# Patient Record
(demographics unavailable — no encounter records)

---

## 2024-11-04 NOTE — HISTORY OF PRESENT ILLNESS
[FreeTextEntry1] : Pt is a 49 y/o male with DM2, HLD, HTN, CAD.  DM diagnosis: 20 years, DM2 Last A1c: 7.0%-->7.8% Home DM meds: Xigduo 5-1000mg BID, ozempic 2mg weekly, T-slim insulin pump. Doesn't have credentials today to log into pump. 14 day delivery summary: basal 21 units, 19.1%; food bolus 79.5 units, 71.5%; correction bolus 10.27u, 9.2%; override 4.93, 51.8%; control iq 4.57, 48.1% Manual settings:  Basal - 12a 1.5 units TDBD 24 units ICR - 1:6 ISF 1:25  IOB 5 hours Patient not sleeping enough. Working a lot. Works for [employer]. Sometimes doesn't wear insulin pump at all. Happens for a few hours on some days. SMBG: Cannot access CGM report now. Fasting low 100s-150s. No lows. After carbs goes up to 200s sometimes. Symptoms: No N/V/D. Diet at home: Gets hyperglycemia even with minimal carbs. Lost 11 pounds. Due to stress he thinks. Is stress eating fruit at night though. Has been physically active. Microvascular complications: Has stable moderately increased albuminuria on farxiga, no CKD, UTD with ophtho, no retinopathy, gets some neuropathy Macrovascular complications: CAD on ASA and rosuvastatin 40mg daily. Following with cardio. ACEi/ARB: telmisartan Statin: crestor 40 and asa 81mg PMHx: As above. No hx of HF, pancreatitis, UTIs/yeast infections. No amputations or wounds. FHx: Mother with DM and maybe father. Father with sudden fatal MI age 39 or 40. No thyroid cancer. SHx: No tobacco. Occasional etoh use, once a month.

## 2024-11-04 NOTE — ASSESSMENT
[FreeTextEntry1] : Pt is a 49 y/o male with DM2, HLD, HTN, CAD.  Well-controlled T2DM complicated by neuropathy Last A1c: 7.0%-->7.8% Previous endocrinologist: Woo Walsh Home DM meds: Xigduo 5-1000mg BID, ozempic 2mg weekly, T-slim insulin pump. Doesn't have credentials today to log into pump. 14 day delivery summary: basal 21 units, 19.1%; food bolus 79.5 units, 71.5%; correction bolus 10.27u, 9.2%; override 4.93, 51.8%; control iq 4.57, 48.1% Manual settings:  Basal - 12a 1.5 units TDBD 24 units ICR - 1:6 ISF 1:25  IOB 5 hours -Continue xigduo 5-1000mg BID -Switch ozempic to mounjaro 5mg weekly. Can go up to 7.5mg weekly after 1 month if needed. -Patient left insulin pump at home today, encouraged adherence, will consider further adjustments next time. Will also try to get credentials so we can login to profile. -Patient advised to check FSBG twice daily in staggered manner and bring logs to next visit -Obtain urine albumin-to-creatinine ratio annually to screen for moderately increased albuminuria -Screen for Vitamin B12 deficiency annually as on Metformin -UTD with ophthalmologist for dilated eye exam -UTD with podiatrist -Referral to nutritionist  HTN -Goal BP <130/80. On telmisartan.  HLD CAD -Continue rosuvastatin 40mg daily and ASA 81mg daily. Has significant fhx of heart disease and CAD personally. Discussed need for tight control of BG and lipids.  Thyroid nodules Spongiform, subcm, no need to f/u with imaging  Patient's condition is high risk with use of medication that requires close monitoring.  Tanner France DO.

## 2025-03-13 NOTE — PHYSICAL EXAM
[2+] : left foot dorsalis pedis 2+ [Varicose Veins Of Lower Extremities] : not present [FreeTextEntry3] : Temperature gradient cooler distally. There is some discoloration in the distal aspects of both feet, unsure if he has Raynaud's syndrome. Patient denies any pain at this time. Slight delay in cap. fill time 5 seconds x10.  [de-identified] : Muscle strength is 5/5. There is negative joint effusion or pain on pedal ROM. Negative pain on palpation of the lesser MPJ's. Negative pain on palpation along the metatarsal heads or shafts bilaterally. There is negative pain on medial and lateral compression of the foot. [FreeTextEntry4] :   decreased vibratory at the 1st MPJ and minimally decreased at the medial malleoli  [FreeTextEntry8] :   decreased vibratory at the 1st MPJ and minimally decreased at the medial malleoli  [FreeTextEntry1] : Light touch is intact bilateral.

## 2025-03-13 NOTE — HISTORY OF PRESENT ILLNESS
[FreeTextEntry1] : Patient presents today with complaint of pain in the plantar aspect of his right foot. He states it has been present for approximately several days. He thought maybe he stepped on something. He does work from home. He is barefoot in the house. He denies recalling stepping on anything. He started feeling tenderness on the plantar aspect of that right foot. The patient stated he had a previous history in the past where he had gone to the hospital. They took an x-ray. They debrided. There was some pus there years ago and he was just concerned if there was a recurrence of the problem. They had not found a foreign body at that time. He denies any trauma to the foot. He denies changing any shoe gear. He presents today wearing Crocs. He denies any fever, chills or any other constitutional symptoms. The patient has seen vascular in the past due to changes in the appearance of the toes. He was worked up for acute ischemia. He states he had ANTHONY's and PVR's at the time and there was no significance of arterial insufficiency at that time in December 2023. No further work-up had been warranted. The patient's hemoglobin A1c was 7.8 in November.

## 2025-03-13 NOTE — PHYSICAL EXAM
[2+] : left foot dorsalis pedis 2+ [Varicose Veins Of Lower Extremities] : not present [FreeTextEntry3] : Temperature gradient cooler distally. There is some discoloration in the distal aspects of both feet, unsure if he has Raynaud's syndrome. Patient denies any pain at this time. Slight delay in cap. fill time 5 seconds x10.  [de-identified] : Muscle strength is 5/5. There is negative joint effusion or pain on pedal ROM. Negative pain on palpation of the lesser MPJ's. Negative pain on palpation along the metatarsal heads or shafts bilaterally. There is negative pain on medial and lateral compression of the foot. [FreeTextEntry4] :   decreased vibratory at the 1st MPJ and minimally decreased at the medial malleoli  [FreeTextEntry8] :   decreased vibratory at the 1st MPJ and minimally decreased at the medial malleoli  [FreeTextEntry1] : Light touch is intact bilateral.

## 2025-03-13 NOTE — PROCEDURE
[FreeTextEntry1] : X-ray Report: X-rays were taken, right foot DP, medial oblique and lateral to rule out foreign body. There were negative signs of acute fracture, dislocation or subluxation. There was negative foreign body or gas in tissue noted.

## 2025-03-13 NOTE — PHYSICAL EXAM
[2+] : left foot dorsalis pedis 2+ [Varicose Veins Of Lower Extremities] : not present [FreeTextEntry3] : Temperature gradient cooler distally. There is some discoloration in the distal aspects of both feet, unsure if he has Raynaud's syndrome. Patient denies any pain at this time. Slight delay in cap. fill time 5 seconds x10.  [de-identified] : Muscle strength is 5/5. There is negative joint effusion or pain on pedal ROM. Negative pain on palpation of the lesser MPJ's. Negative pain on palpation along the metatarsal heads or shafts bilaterally. There is negative pain on medial and lateral compression of the foot. [FreeTextEntry4] :   decreased vibratory at the 1st MPJ and minimally decreased at the medial malleoli  [FreeTextEntry8] :   decreased vibratory at the 1st MPJ and minimally decreased at the medial malleoli  [FreeTextEntry1] : Light touch is intact bilateral.

## 2025-03-13 NOTE — ASSESSMENT
[FreeTextEntry1] : Impression: Diabetic with neurological manifestations. IPK. Metatarsalgia.   Treatment: Discussed findings and conditions with the patient. The lesions on the plantar aspect of the right foot were prepped and trimmed without incidence. Negative signs of foreign body noted. Negative deeper portal of entry. Negative thrombosed vesicles or pinpoint bleeding upon treatment of the lesion. It was cleansed with normal saline and antibiotic ointment was applied to the area with a dispersion pad. He was supplied additional dispersion pads to off-load the area. He was encouraged to wear socks in the house along with slippers to help off-load the area especially since he does work from home. 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 Discussed risk of contact with the cold temperature of the floor that could also cause issues with vascular and risk of tissue thermal damage to the skin, along with the plantar aspect of the foot while walking barefoot or just with socks, he could step on a foreign body and be a portal of entry. At this time there is no foreign body noted. He is to continue with the padding at this time. Patient is to return in approximately 2 weeks for further evaluation. If there is increase in pain, redness, swelling, problems, or concern, he is to contact the office for an immediate appointment.

## 2025-05-20 NOTE — HISTORY OF PRESENT ILLNESS
[FreeTextEntry1] : Very pleasant 49-year-old gentleman who presents for evaluation of balanitis, phimosis.  He reports that he is able to retract his foreskin, however it is painful.  He reports foreskin fissures.  He reports balanitis.  He is interested in a circumcision.  He denies urinary complaints.  He reports diabetes is moderately controlled.  Most recent hemoglobin A1c from November 2024 was 7.8.

## 2025-05-20 NOTE — HEALTH RISK ASSESSMENT
[Good] : ~his/her~  mood as  good [Monthly or less (1 pt)] : Monthly or less (1 point) [1 or 2 (0 pts)] : 1 or 2 (0 points) [Never (0 pts)] : Never (0 points) [No] : In the past 12 months have you used drugs other than those required for medical reasons? No [No falls in past year] : Patient reported no falls in the past year [Little interest or pleasure doing things] : 1) Little interest or pleasure doing things [Feeling down, depressed, or hopeless] : 2) Feeling down, depressed, or hopeless [0] : 2) Feeling down, depressed, or hopeless: Not at all (0) [PHQ-2 Negative - No further assessment needed] : PHQ-2 Negative - No further assessment needed [Yes] : takes [Never] : Never [NO] : No [Patient reported colonoscopy was abnormal] : Patient reported colonoscopy was abnormal [HIV test declined] : HIV test declined [Hepatitis C test offered] : Hepatitis C test offered [None] : None [With Family] : lives with family [# of Members in Household ___] :  household currently consist of [unfilled] member(s) [Employed] : employed [College] : College [] :  [# Of Children ___] : has [unfilled] children [Feels Safe at Home] : Feels safe at home [Fully functional (bathing, dressing, toileting, transferring, walking, feeding)] : Fully functional (bathing, dressing, toileting, transferring, walking, feeding) [Fully functional (using the telephone, shopping, preparing meals, housekeeping, doing laundry, using] : Fully functional and needs no help or supervision to perform IADLs (using the telephone, shopping, preparing meals, housekeeping, doing laundry, using transportation, managing medications and managing finances) [Smoke Detector] : smoke detector [Carbon Monoxide Detector] : carbon monoxide detector [Safety elements used in home] : safety elements used in home [Seat Belt] :  uses seat belt [de-identified] : none [de-identified] : diabetic diet  [[id number]] : 0 [Change in mental status noted] : No change in mental status noted [Language] : denies difficulty with language [Behavior] : denies difficulty with behavior [Learning/Retaining New Information] : denies difficulty learning/retaining new information [Handling Complex Tasks] : denies difficulty handling complex tasks [Reasoning] : denies difficulty with reasoning [Spatial Ability and Orientation] : denies difficulty with spatial ability and orientation [Sexually Active] : not sexually active [Reports changes in hearing] : Reports no changes in hearing [Reports changes in vision] : Reports no changes in vision [Reports changes in dental health] : Reports no changes in dental health [Sunscreen] : does not use sunscreen [Travel to Developing Areas] : does not  travel to developing areas [TB Exposure] : is not being exposed to tuberculosis [Caregiver Concerns] : does not have caregiver concerns [ColonoscopyDate] : 12/10/21 [ColonoscopyComments] : hyperplastic polyps [FreeTextEntry2] :  [de-identified] : last eye exam 3months ago  [de-identified] : last dental 12/24 [AdvancecareDate] : 5/20/25

## 2025-05-20 NOTE — ASSESSMENT
[FreeTextEntry1] : Very pleasant 49-year-old gentleman who presents for evaluation of phimosis, balanitis - We had an extensive discussion regarding options for management of phimosis and balanitis.  We discussed the option to try a steroid-based and antifungal cream, however at this time he is interested in a circumcision as he is very uncomfortable and reports that foreskin fissures are painful -Check hemoglobin A1c.  We discussed the need to have this below 8 prior to doing circumcision -CMP -CBC -Urinalysis - I discussed the risks, benefits and alternatives to circumcision with the patient, including but not limited to bleeding, infection, pain, irritation of the glans, and penile injury.  I also discussed the need to refrain from sexual intercourse for 6 weeks, or until the circumcision incision heals.  The patient wishes to proceed and we will schedule the surgery for the near future.  Patient is being seen today for evaluation and management of a chronic and longitudinal ongoing condition and I am the primary treating physician

## 2025-05-20 NOTE — ASSESSMENT
[Vaccines Reviewed] : Immunizations reviewed today. Please see immunization details in the vaccine log within the immunization flowsheet.  [FreeTextEntry1] : health he needs  dental exam  psa  lab testing  and is up to date with  colon cancer screening   2 bmi 26   Weight loss, exercise, and diet control were discussed and are highly encouraged. Treatment options were given such as, aqua therapy, and contacting a nutritionist. Recommended to use the elliptical, stationary bike, less use of treadmill. Mindful eating was explained to the patient Obesity is associated with worsening asthma, shortness of breath, and potential for cardiac disease, diabetes, and other underlying medical conditions.  3 dm  ---The following has been discussed:--- -Targets for weight and HgA1c have been discussed with patient  -FS goals have been reviewed with the patient in detail: AM <130 post meal<160-180 -Diet and weight goals have been discussed with the patient in detail. -The importance of exercise in the treatment of diabetes has been discussed  with the patient in detail. -Extensive dietary advice provided to patient and the need to avoid concentrated  sweets/simple carbohydrates and to ensure to consume protein with each meal.  -Patient instructed to limit carbohydrates to 60 gms per meal and 15 gms per  snacks.  -Patient to keep a blood sugar log to check fasting and before meals -Patient instructed on importance of daily feet inspection and to reports any  open lesions to physician promptly A diet that includes carbohydrates from fruits, vegetables, whole grains, legumes, and low-fat milk is encouraged. People with diabetes are advised to avoid sugar-sweetened beverages (including fruit juice).   The ideal amount of carbohydrate intake is uncertain. However, it's important for people with diabetes to monitor carbohydrate intake in order to manage their blood sugar levels and adjust insulin dosing as needed. (See 'Carbohydrate counting' above.)   ?In general, a variety of eating patterns (low fat, low carbohydrate, Mediterranean, vegetarian) are acceptable. Eating a healthy diet that contains a lot of the foods you like will make it easier to stick to your plan. However, you should talk to your health care provider before starting any diet that involves extreme restriction (such as a very low carb or "keto" diet). Depending on your situation, some diets may not be recommended.   ?The type of fat consumed appears to be more important than the amount of total fat. Saturated fats (eg, in meats, cheese, ice cream) can be replaced with monounsaturated and polyunsaturated fatty acids (eg, in fish, olive oil, nuts). Trans fatty acid consumption should be kept as low as possible. Trans fats are banned from processed foods in the [place]. Although very small amounts of trans fats are naturally present in meats, poultry and dairy products, the amount is too small for concern.   As diabetes increases your risk of heart disease and stroke, eating a diet low in saturated and trans fats and cholesterol can help to reduce your cholesterol levels and decrease these risks.   ?A dietitian can help you to determine how much protein your diet should include. In general, it's a good idea to get protein from lean meats, fish eggs, beans, soy, and nuts, and to limit the amount of red meat you eat.   ?Eating a diet that is high in fiber may help to keep your blood sugar levels under control.  ?A diet that is low in sodium and high in fruits, vegetables, and low-fat dairy products can help keep blood pressure under control.   ?Artificial sweeteners do not affect blood glucose levels and may be consumed in moderation. If you consume sugar-sweetened beverages regularly, a beverage containing artificial sweeteners (such as diet soda) can be a good short-term replacement strategy. However, the best approach is to avoid both sugar-sweetened and artificially sweetened beverages, and try to drink more water.   ?In the past, people with diabetes were told to avoid all foods with added sugar. This is no longer recommended, although it's important to limit sugar intake. If you take insulin, you should calculate each pre-meal dose based upon the total number of carbohydrates in the food, which includes the sugar content. (See 'Carbohydrate counting' above.)   ?Products that are "sugar-free" or "fat-free" do not necessarily have a reduced number of calories or carbohydrates. Read all nutrition labels carefully and compare with other similar products to determine which has the best balance of serving size and number of calories, carbohydrates, fat, and fiber.   Some sugar-free foods, such as sugar-free gelatin and sugar-free gum, do not have a significant number of calories or carbohydrates and are considered "free foods." Any food that has less than 20 calories and 5 grams of carbohydrate is considered a free food, meaning that it does not affect body weight or require an adjustment to your medication. 4 aathma  Asthma is believed to be caused by inherited (genetic) and environmental factor, but its exact cause is unknown. Asthma may be triggered by allergens, lung infections, or irritants in the air. Asthma triggers are different for each person  -Inhaler technique reviewed as well as oral hygiene techniques reviewed with patient. Avoidance of cold air, extremes of temperature, rescue inhaler should be used before exercise. Order of medication reviewed with patient. Recommended use of a cool mist humidifier in the bedroom.  5 hpn   DIETARY SALT (SODIUM); DASH DIET AND BLOOD PRESSURE: To decrease the sodium in your diet:   Use fresh vegetables and foods as much as possible.  Avoid canned and processed foods. Cured meats such as anderson, ham, and sausages are high in salt.  Try using different herbs and spices in your cooking instead of salt.  In restaurants, avoid foods with sauces, cheese, and cured meats. Ask for low-sodium choices. To get more potassium in your diet, eat:  Bananas, fresh or dried apricots, peaches, citrus fruits, melons  Cauliflower, broccoli, tomatoes, carrots, raw spinach, beet greens, potatoes To get more magnesium in your diet, eat:  Whole grain foods, leafy green vegetables, dried fruits  Fish and seafood, poultry  To get more calcium in your diet, eat:  Nonfat milk, yogurt, and low-fat cheeses   [place] and sardines  Cooked dried beans  Broccoli, kale, and bok damaris  Tofu or soybean curd DASH stands for "dietary approaches to stop hypertension." The DASH diet is low in total and saturated fat. It is rich in fruits, vegetables, and low-fat dairy foods. The diet allows you to get natural fiber, calcium, and magnesium from food. It prevents or lowers high blood pressure. It can also help lower cholesterol in your blood.  Don't change how you eat all at once. It's much more likely that you'll succeed if you make only one or two small changes at a time. Wait until those changes are a habit, then make a couple more changes. Some good starting steps include:  Add one serving of vegetables to your meals at lunch and dinner. This is an easy way to help you get more vegetables in your diet.  Have a piece of fruit as an afternoon or after-dinner snack. One glass of juice at breakfast is not enough fruit in your diet.  Use half your usual amount of butter, margarine, or salad dressing.  Buy nonfat salad dressing or nonfat sour cream. Follow this guide to select your menu of meals. The number of calories we want you to eat each day will tell you how many servings you can choose from each food group. Calories: 1600 2100 2600 3100 Servings Grains 6 7  10  12  Vegetables 	 4 4  5 6 Fruit 4 5 5 6 Dairy (low-fat) 2  3 3 3  Meat, poultry, fish  1  2 2  Nuts, seeds    1 Fats and sweets 1  2  3 4 Grains and grain products like breads and cereals provide energy, fiber and vitamins. Whole grains have more of these nutrients. One serving equals one of the following: Bagel, 1/2 medium; Barley, cooked 1/2 cup; Biscuit, country style 1 medium; Bread, whole wheat, white 1 slice; Cereals, cold or cooked, 1/2 cup; Cornbread, 1 medium piece; Crackers, gavino, 2; Crackers, saltine, 4; Dinner roll, 1medium; English muffin, ; Hamburger bun, ; Muffin, 1 medium; Pancake, 1 medium; Pasta, 1/2 cup cooked; Shira, 1/2 large or 1 small; Popcorn, 1 cup popped; Pretzels, 1 ounce; Rice, white, brown, or wild, 1/2 cup cooked; Tortillas, corn or flour, 1 medium; Waffle, 1 medium; Wheat germ, 1/4 cup;  Vegetables are rich sources of potassium, magnesium, and fiber. One serving is 1/2 cup of any of the following cooked vegetables: Asparagus, Beans (green, yellow), Beets, Broccoli, [place] Sprouts, Carrots, Cauliflower, [place], chicory, mustard and turnip (and other) greens, Corn, Kale, Lima beans, Mixed vegetables, Okra, Parsnips, Peas, green, Potatoes (1/2 medium or 1/2 cup mashed), Pumpkin, Rutabaga, Spaghetti or tomato sauce, Spinach, Squash (zucchini or yellow), Stewed tomatoes, Succotash, Sweet potatoes, Turnips, Yam  Raw vegetables: Carrots,1/2 cup; Celery, 1/2 cup; Lettuce (sayra, loose-leaf, green-leaf), 1 cup; Peppers, 1/2 cup; Spinach, 1 cup; Tomato, 1/2 Fruits and fruit juices are important sources of potassium and magnesium. Fruits also contain fiber and are low in sodium and fat. One serving equals: Any fruit juice, # cup (6 ounces); Canned or frozen fruit,  cup (includes applesauce); Dried fruit,  cup;  Fresh fruit: Apple, 1 medium; Apricots, 2 medium; Banana, 1 medium; Berries, 1/2 cup; Melon, 1 wedge, or 1/2 cup; Cherries, 10; Grapefruit, 1/2; Grapes, 15; Kiwi, 1 medium; Rubén, 1/2 small; Nectarine, 1 medium; Orange, 1 medium; Peach, 1 medium; Pear, 1 medium; Pineapple, 1/2 cup; Plums, 2 medium; Tangerine, 1 large Dairy foods provide protein and calcium. Use low-fat or nonfat dairy products to cut down on fat. One serving equals: Skim milk, 1 cup (8 ounces); 1% low fat milk, 1 cup (8 ounces); 2% low fat milk, 1 cup (8 ounces) nonfat dry milk powder (1/3 cup); Low-fat cottage cheese, 1 cup (8 ounces); Parmesan cheese, 1 tablespoon; Mozzarella cheese, part skim, 1/4 cup (1 ounce); Low-fat cheddar cheese, 11/2 ounces; Ricotta cheese, part skim milk or nonfat, 1/4 cup (11/2 ounces); Other low fat or nonfat cheeses (11/2 oz.); Low-fat or nonfat yogurt, fruit-flavored or plain, 1 cup (8 ounces) Low-fat or nonfat frozen yogurt, 1/2 cup (4 ounces); Note: People who can't digest dairy products can try taking lactase enzyme pills or drops (available at drug and grocery stores) when they eat dairy. There is also milk available with the enzyme already added. Or you can buy lactose-free milk. Meat, poultry, and fish are good sources of protein and magnesium. One serving equals: Lean meat including beef, veal, or pork, 3 ounces cooked; Skinless, white meat poultry including turkey, chicken, 3 ounces; Fish and shellfish, 3 ounces cooked; Low-fat luncheon meats, 1 ounce; Egg, 1 medium; Tofu, 3 ounces Note: Three ounces of cooked meat is about the size of a deck of cards. Nuts, seeds, and legumes are rich sources of energy, magnesium, potassium, protein and fiber. Nuts and seeds are also high in fat, so portions should be small. Almonds, 1/3 cup; Beans such as kidney, sánchez, and navy, 1/2 cup cooked; Chickpeas and lentils, 1/2 cup cooked; Cashews, 1/3 cup; Filberts, 1/3 cup; Mixed nuts, 1/3 cup; Peanut butter, 2 tablespoons; Peanuts, 1/3 cup; Sesame seeds, 2 tablespoons; Sunflower seeds, 2 tablespoons; Tofu, regular, 3 ounces; Walnuts, 1/3 cup  Following the above diet will give you about 27% fat in your diet. The goal is to have 30% or less of the calories you eat each day be from fat. To meet that goal, do not eat more than 2-3 servings daily of added fat. Also try to limit sweets. One serving equals: Butter or margarine, 1 teaspoon; Mayonnaise, 1 teaspoon; Low-fat mayonnaise, 1 tablespoon; Salad dressing, 1 tablespoon; Low-fat salad dressing, 2 tablespoons; Oil, 1 teaspoon (use olive, canola, safflower, or other vegetable oils); Candy, hard, 3 pieces; Jelly or jam, 1 tablespoon); Jell-O, 1/2 cup; Jelly beans, 1/2 ounce; Maple syrup, 1 tablespoon; Popsicle, 1; Sherbet or nonfat or low-fat frozen yogurt, 1/2 cup; Sugar, 1 tablespoon; Sugared lemonade or fruit punch, 1 cup (8 ounces); Note: Try diet fruit-flavored gelatin or frozen, canned, or fresh fruit for dessert.  Small amounts of alcohol may have benefits to the heart and blood pressure. However, excess use of alcohol can cause damage to the brain, liver and other organs. It can lead to high blood pressure. Drinking more than two drinks (15 ml) every day can raise your blood pressure. 15 ml of alcohol equals:   one 12-ounce bottle of beer   a half glass (5 ounces) of wine   1 ounce (one shot) of 100 proof hard liquor  6fatty liver  Fatty Liver: The patient denies any jaundice or pruritus. The patient denies any alcohol use. The patient denies taking large doses of nonsteroidal anti-inflammatory drugs or acetaminophen. The findings are suggestive of fatty liver. The patient and I had a long discussion regarding the risks of fatty liver progressing to cirrhosis. The patient was told of the possible increased risk of developing liver failure, cirrhosis, ascites, GI bleeding secondary to varices, hepatic encephalopathy, bleeding tendencies and liver cancer. The patient was told of the importance of follow-up. The patient was advised to follow up every 6 months for blood work and imaging studies. The patient agreed and will follow up. The patient was advised to lose weight. I recommend a trial of vitamin E supplementation for the fatty liver. If the liver enzymes remain elevated, the patient may require a trial of Pioglitazone for the fatty liver. I recommend avoid alcohol and hepato-toxic agents. The patient was also advised to avoid NSAIDs, Acetaminophen and any other hepatotoxic drugs. The patient was also advised not to share needles, razors, scissors, nail clippers, etc.. The patient is to continue close follow-up in our office for blood work and exams. If the liver enzymes remain elevated, the patient may require a CT guided liver biopsy to assess the liver parenchyma and for possible treatment. We had a long discussion regarding the risks and benefits of the procedure. The patient was told of the risks of bleeding, perforation, infections, emergency surgery and missing lesions. The patient agreed and will follow-up to reassess the symptoms Patient has been counseled on life style interventions, including but not limited to: weight loss (3-5% loss of body weight might improve fat in the liver, while 7-10% needed for potential improvement of other components, including inflammation and scarring of the liver, called fibrosis); healthy diet, avoiding added sugars, sodas, avoiding saturated fats, limiting sodium, avoiding alcohol; and on the importance of regular exercise (> 150 min/week moderate intensity aerobic exercise with at least 2x/week muscle strengthening or exercise as tolerated).  - I explained to patient the natural Hx of NAFLD.  7thyroid nodules  fu   8 tight foreskin refer to urologist  pt wants circumcision 9 polycythemia  check levels   10preoteinuria   check urine

## 2025-05-20 NOTE — COUNSELING
[Fall prevention counseling provided] : Fall prevention counseling provided [Adequate lighting] : Adequate lighting [No throw rugs] : No throw rugs [Use proper foot wear] : Use proper foot wear [Sleep ___ hours/day] : Sleep [unfilled] hours/day [Engage in a relaxing activity] : Engage in a relaxing activity [Plan in advance] : Plan in advance [Potential consequences of obesity discussed] : Potential consequences of obesity discussed [Benefits of weight loss discussed] : Benefits of weight loss discussed [Structured Weight Management Program suggested:] : Structured weight management program suggested [Encouraged to maintain food diary] : Encouraged to maintain food diary [Encouraged to increase physical activity] : Encouraged to increase physical activity [Encouraged to use exercise tracking device] : Encouraged to use exercise tracking device [Weigh Self Weekly] : weigh self weekly [Decrease Portions] : decrease portions [____ min/wk Activity] : [unfilled] min/wk activity [Keep Food Diary] : keep food diary [FreeTextEntry2] : bmi 26  [None] : None [Good understanding] : Patient has a good understanding of lifestyle changes and steps needed to achieve self management goal

## 2025-05-20 NOTE — PHYSICAL EXAM
[Well Developed] : well developed [Well Nourished] : well nourished [Conjunctiva] : the conjunctiva were normal in both eyes [PERRL] : pupils were equal in size, round, and reactive to light [EOM Intact] : extraocular movements were intact [Normal Appearance] : was normal in appearance [Neck Supple] : was supple [Rate ___] : at [unfilled] breaths per minute [Normal Rhythm/Effort] : normal respiratory rhythm and effort [Clear Bilaterally] : the lungs were clear to auscultation bilaterally [Normal to Percussion] : the lungs were normal to percussion [5th Left ICS - MCL] : palpated at the 5th LICS in the midclavicular line [Heart Rate ___] : [unfilled] bpm [Rhythm Regular] : regular [Normal Rate] : normal [Normal S1] : normal S1 [Normal S2] : normal S2 [No Murmur] : no murmurs heard [No Pitting Edema] : no pitting edema present [2+] : left 2+ [No Abnormalities] : the abdominal aorta was not enlarged and no bruit was heard [Examination Of The Breasts] : a normal appearance [No Discharge] : no discharge [Soft, Nontender] : the abdomen was soft and nontender [No Mass] : no masses were palpated [No HSM] : no hepatosplenomegaly noted [None] : no CVA tenderness [No Lymphangitis] : no lymphangitis observed [Normal Kyphosis] : normal kyphosis [No Visual Abnormalities] : no visible abnormalities [Normal Lordosis] : normal lordosis [No Scoliosis] : no scoliosis [No Tenderness to Palpation] : no spine tenderness on palpation [No Masses] : no masses [Full ROM] : full ROM [No Pain with ROM] : no pain with motion in any direction [Intact] : all reflexes within normal limits bilaterally [Normal Station and Gait] : the gait and station were normal [Normal Motor Tone] : the muscle tone was normal [Involuntary Movements] : no involuntary movements were seen [Normal Scalp] : inspection of the scalp showed no abnormalities [Examination Of The Hair] : texture and distribution of hair was normal [Complexion Medium] : medium complexion [Normal] : the deep tendon reflexes were normal [Normal Mental Status] : the patient's orientation, memory, attention, language and fund of knowledge were normal [Appropriate] : appropriate [Comprehensive Foot Exam Normal] : Right and left foot were examined and both feet are normal. No ulcers in either foot. Toes are normal and with full ROM.  Normal tactile sensation with monofilament testing throughout both feet [Enlarged Diffusely] : was not enlarged [___] : a single ~M [unfilled] ~Ucm nodule palpated on the right lobe of the thyroid [S3] : no S3 [S4] : no S4 [Rt] : no varicose veins of the right leg [Lt] : no varicose veins of the left leg [Right Carotid Bruit] : no bruit heard over the right carotid [Left Carotid Bruit] : no bruit heard over the left carotid [Right Femoral Bruit] : no bruit heard over the right femoral artery [Left Femoral Bruit] : no bruit heard over the left femoral artery [Bruit] : no bruit heard [Penis Abnormality] : normal uncircumcised penis [Urinary Bladder Findings] : the bladder was normal on palpation [Scrotum] : the scrotum was normal [Rectal Exam - Seminal Vesicles] : the seminal vesicles were normal [Epididymis] : the epididymides were normal [Testes Tenderness] : no tenderness of the testes [Testes Mass (___cm)] : there were no testicular masses [Postauricular Lymph Nodes Enlarged Bilaterally] : nodes not enlarged [Preauricular Lymph Nodes Enlarged Bilaterally] : nodes not enlarged [Submandibular Lymph Nodes Enlarged Bilaterally] : nodes not enlarged [Suboccipital Lymph Nodes Enlarged Bilaterally] : nodes not enlarged [Submental Lymph Nodes Enlarged] : nodes not enlarged [Cervical Lymph Nodes Enlarged Posterior Bilaterally] : nodes not enlarged [Cervical Lymph Nodes Enlarged Anterior Bilaterally] : nodes not enlarged [Supraclavicular Lymph Nodes Enlarged Bilaterally] : nodes not enlarged [Axillary Lymph Nodes Enlarged Bilaterally] : nodes not enlarged [Epitrochlear Lymph Nodes Enlarged Bilaterally] : nodes not enlarged [Femoral Lymph Nodes Enlarged Bilaterally] : nodes not enlarged [Inguinal Lymph Nodes Enlarged Bilaterally] : nodes not enlarged [Abnormal Color] : normal color and pigmentation [Skin Lesions 1] : no skin lesions were observed [Tattoo - Single] : no tattoos observed [Skin Turgor Decreased] : normal skin turgor [Impaired judgment] : intact judgment [Impaired Insight] : intact insight [de-identified] : tongue normal teeth normal

## 2025-05-20 NOTE — HISTORY OF PRESENT ILLNESS
[FreeTextEntry1] : cpe [de-identified] : Pt is a 49 yr old with  type 1 Dm, hld, hpn polycythemia, thyroid nodules , lactose intolerance ,sleep disturbance, and fatty liver who is here for his cpe  Presently He -denies any headaches, nausea, vomiting, fever, chills, sweats, chest pain, chest pressure, diarrhea, constipation, dysphagia, sour taste in the mouth, dizziness, leg swelling, leg pain, myalgias, arthralgias, itchy eyes, itchy ears, heartburn, or reflux.

## 2025-06-04 NOTE — HISTORY OF PRESENT ILLNESS
[Home] : at home, [unfilled] , at the time of the visit. [Medical Office: ([facility])___] : at the medical office located in  [Telephone (audio)] : This telephonic visit was provided via audio only technology. [Technical] : patient unable to effectively utilize tele-video due to technical issues. [Verbal consent obtained from patient] : the patient, [unfilled] [FreeTextEntry1] : The patient-doctor relationship has been established via real time audio communication using telemedicine software.  He has requested care to be assessed and treated through telemedicine. He understands that there may be limitations in this process and that he may need further follow up care in the office and/or hospital setting. I attempted to connect with the patient multiple times via telehealth video platforms, including [employer] and [employer], however he was unable to establish a video connection. He requested that care be provided via audio only.  Very pleasant 49-year-old gentleman who presents for follow up of balanitis, phimosis.  He reports that he is able to retract his foreskin, however it is painful.  He reports foreskin fissures.  He remains interested in a circumcision. Hemoglobin A1c from November 2024 was 7.8.  This was rechecked in May 2025 and was found to be 12.7.  He presents today to discuss options for management moving forward.

## 2025-06-04 NOTE — ASSESSMENT
[FreeTextEntry1] : Very pleasant 49-year-old gentleman who presents for follow-up of phimosis, balanitis, very poorly controlled diabetes - We discussed poorly controlled diabetes and how this may impede wound healing after a circumcision - Patient is currently comfortable - I highly recommended that he work on his diabetes and improve this prior to undergoing a circumcision with which he is in agreement - Repeat hemoglobin A1c in 6 weeks, after which we will schedule circumcision if hemoglobin A1c has improved  Patient is being seen today for evaluation and management of a chronic and longitudinal ongoing condition and I am the primary treating physician

## 2025-06-11 NOTE — COUNSELING
[Sleep ___ hours/day] : Sleep [unfilled] hours/day [Engage in a relaxing activity] : Engage in a relaxing activity [Plan in advance] : Plan in advance [Potential consequences of obesity discussed] : Potential consequences of obesity discussed [Structured Weight Management Program suggested:] : Structured weight management program suggested [Benefits of weight loss discussed] : Benefits of weight loss discussed [Encouraged to maintain food diary] : Encouraged to maintain food diary [Encouraged to increase physical activity] : Encouraged to increase physical activity [Weigh Self Weekly] : weigh self weekly [Encouraged to use exercise tracking device] : Encouraged to use exercise tracking device [Decrease Portions] : decrease portions [____ min/wk Activity] : [unfilled] min/wk activity [Keep Food Diary] : keep food diary [FreeTextEntry1] : diabetic low fat low salt  [FreeTextEntry2] : bmi 26 [None] : None [Good understanding] : Patient has a good understanding of lifestyle changes and steps needed to achieve self management goal

## 2025-06-11 NOTE — HEALTH RISK ASSESSMENT
[No] : In the past 12 months have you used drugs other than those required for medical reasons? No [No falls in past year] : Patient reported no falls in the past year [Never] : Never [Little interest or pleasure doing things] : 1) Little interest or pleasure doing things [3] : 1) Little interest or pleasure doing things for nearly every day (3) [Feeling down, depressed, or hopeless] : 2) Feeling down, depressed, or hopeless [2] : 2) Feeling down, depressed, or hopeless for more than half of the days (2) [PHQ-2 Positive] : PHQ-2 Positive [1/2 of Days or More (2)] : 2.) Feeling down, depressed or hopeless? Half the days or more [Nearly Every Day (3)] : 4.) Feeling tired or having little energy? Nearly every day [Moderate] : Severity of Depression is Moderate [Not at All (0)] : 9.) Thoughts that you would be off dead or of hurting yourself in some way? Not at all [Somewhat Difficult] : How difficult have these problems made it for you to do your work, take care of things at home, or get along with people? Somewhat difficult [PHQ-9 Positive] : PHQ-9 Positive [I have developed a follow-up plan documented below in the note.] : I have developed a follow-up plan documented below in the note. [Time Spent: ___ Minutes] : I spent [unfilled] minutes performing a depression screening for this patient. [de-identified] : walks  [de-identified] : jamshid canchola [de-identified] : diabetic diet  [[id number]] : 5 [[id number]] : 11 [AdvancecareDate] : 6/11/25

## 2025-06-11 NOTE — HISTORY OF PRESENT ILLNESS
[FreeTextEntry1] : fu  [de-identified] : ptis a 49 yr old man with type 1 Dm, hld, hpn polycythemia, thyroid nodules , lactose intolerance ,sleep disturbance, and fatty liver who was to have surgery for phimosis and circ but his blood sugars are not well controlled. His hgb A1c is 12.7  and  ldl is 76 and albumin was elevated in urine.  he states he has a insulin pump but realized it was not in properly and was not receiving insulin at the time.  he has  not fu with endocrinologist. He is on mounjaro  which I will increase to 7.5 mg. His weight has decreased from nov from 189  to 174 by May but so far it remains the same.  he -denies any headaches, nausea, vomiting, fever, chills, sweats, chest pain, chest pressure, diarrhea, constipation, dysphagia, sour taste in the mouth, dizziness, leg swelling, leg pain, myalgias, arthralgias, itchy eyes, itchy ears, heartburn, or reflux. He had us thyroid and has b9 colloid cysts. He had us abd  PROCEDURE DATE:  05/28/2025    INTERPRETATION:  CLINICAL INFORMATION: Fatty liver.  COMPARISON: 12/16/2022.  TECHNIQUE: Sonography of the abdomen.  FINDINGS: Liver: Increased echogenicity. 1.2 cm sized hyperechoic lesion right hepatic lobe. This is indeterminate although it could be related to a hemangioma. Consider correlation with contrast-enhanced MR imaging. Bile ducts: Normal caliber. Common bile duct measures 2 mm. Gallbladder: Within normal limits. Pancreas: Visualized portions are within normal limits. Spleen: 9.4 cm. Within normal limits. Right kidney: 13.4 cm. No hydronephrosis. 1.7 cm midpole cyst. Left kidney: 13.3 cm. No hydronephrosis. Ascites: None. Aorta and IVC: Visualized portions are within normal limits.  IMPRESSION: Fatty liver. 1.2 cm hyperechoic lesion right lobe as described above. I order mri of liver last week  He alos had us axilla 	 EXAM: [id number] - US AXILLA ONLY LEFT  - ORDERED BY:  FRANCESCA LIZARRAGA   PROCEDURE DATE:  05/27/2025    INTERPRETATION:  CLINICAL HISTORY: nodule on left axilla;. Technique: US AXILLARY LEFT Comparison: None available  Findings/ impression:  In the region of the palpable concern there is a superficial heterogeneous lesion measuring 1 x 0.4 x 0.8 cm with apparent skin tract. This is compatible with a sebaceous cyst.  Recommendation: Any decision to biopsy the palpable abnormality should be based on the level of clinical concern.  BI-RADS Category 2: Benign The mri has not been read yet but appears to be a hemangioma.  Pt states for the last few weeks he felt depressed  and saw a psychiatrist and was on a medication yesterday but doesnt know name.   He states he has back pain mid back  left side  and started 3-4 weeks ago. He doesnt take anything for it, occurs daily no pain with getting up from a chair  or going up or down stairs.  He states the pain level is 5/10 and no hx falls or trauma.

## 2025-06-11 NOTE — ASSESSMENT
[FreeTextEntry1] : 1 depression Clinical depression is a medical condition that goes beyond everyday sadness. It can cause profound, long-lasting symptoms and often interferes with ones usual daily activities. A persons vulnerability to developing this disorder is often related to many factors, including changes in brain function, genetics, and life stresses and circumstances.   Depression is the most common psychiatric disorder worldwide. In the [place], nearly 20 percent of the population experiences a bout of clinical depression in their lifetime. Even so, very few people who have the disorder discuss their symptoms with a healthcare provider. Instead, two-thirds of people with depression who see a healthcare provider for routine care come in complaining of physical symptoms, such as headache, back problems, or chronic pain.   People may be reluctant to discuss their depression symptoms for a number of reasons. Often theyre concerned about the stigma of mental illness; sometimes they worry that a primary care provider is not the appropriate health professional to ask; some see their condition as a personal weakness rather than a real illness; and some are worried about the implications of having a psychiatric illness entered into their permanent record. But effective treatments do exist, and not treating depression can lead to serious problems.  People with untreated depression have a lower quality of life, a higher risk of suicide, and worse physical prognoses if they have any medical conditions besides depression. In fact, people with depression are almost twice as likely to die as people without depression, mostly due to other medical conditions. Whats more, depression affects not only the person with the disorder but also those around him or her.   2 dm  not well controlled I increased his mounjaro to 7.5 mg   ---The following has been discussed:--- -Targets for weight and HgA1c have been discussed with patient  -FS goals have been reviewed with the patient in detail: AM <130 post meal<160-180 -Diet and weight goals have been discussed with the patient in detail. -The importance of exercise in the treatment of diabetes has been discussed  with the patient in detail. -Extensive dietary advice provided to patient and the need to avoid concentrated  sweets/simple carbohydrates and to ensure to consume protein with each meal.  -Patient instructed to limit carbohydrates to 60 gms per meal and 15 gms per  snacks.  -Patient to keep a blood sugar log to check fasting and before meals -Patient instructed on importance of daily feet inspection and to reports any  open lesions to physician promptly  3. fatty liver  Reviewed the spectrum of disease, the risk of disease progression to developing cirrhosis and the associated complications. Explained the patient may develop liver cancer without cirrhosis and therefore should be under the yearly surveillance with an abdominal ultrasound. Taught back that the best treatment of fatty liver disease is diet and exercise. Discussed the present diet with the patient and recommended the avoidance of fatty foods and to follow a heart healthy diet. Also explained that weight loss may lead to an improvement in the overall underlying liver disease. Taught back the physiology of alcohol abstinence has a important contribution to liver health.   4. back pain Unless acute low back pain is caused by a serious medical condition (which is uncommon), it typically resolves fairly quickly, even if there is a bulging or herniated disc. Still, low back pain can make it hard to do your usual activities, and it can be frustrating to feel like you just have to wait for it to get better. Below are some simple things you can do that may help relieve your pain. Remaining active  Many people are afraid that they will hurt their back further or delay recovery by remaining active. However, remaining active, to the extent that you are able, is one of the best things you can do for your back. If you have severe pain, you may need to rest your back for a day or so. It may be most comfortable to lie on your back with a pillow under your knees and your head and shoulders elevated. For sleeping, you may want to lie on your side with your upper knee bent and a pillow between your knees. However, prolonged bed rest is not recommended. Studies have shown that people with low back pain recover faster when they remain active. Movement helps to relieve muscle spasms and prevents loss of muscle strength. While you should avoid strenuous activities and sports while you are in pain, it is fine to continue doing regular day-to-day activities and light exercises, such as walking. If certain activities cause your back to hurt too much, try something else instead. Heat  Using a heating pad or heated wrap can help with low back pain during the first few weeks. It is not clear if cold helps as well, but some people may find that it relieves pain temporarily. Modifications at work  Most experts recommend that people with low back pain continue to work so long as it is possible to avoid prolonged standing or sitting and heavy lifting. If your job does not allow you to sit or stand comfortably, you may need to take some time off work while you recover. While standing at work, stepping on a block of wood with one foot (and periodically alternating the foot on the block) may be helpful. Pain medications  You can try taking an over-the-counter medication to help relieve pain. Nonsteroidal antiinflammatory drugs (NSAIDs), such as ibuprofen (sample brand names: Advil, Motrin) and naproxen (brand name: Aleve), may work better than acetaminophen (brand name: Tylenol) for low back pain. If you do take pain medication, it may be more effective to take a dose on a regular basis for three to five days, rather than using the medication only when your pain becomes unbearable. Muscle relaxants (eg, cyclobenzaprine [brand name: Flexeril]) are prescription medications; while these may help relieve back pain, they can cause drowsiness and are probably no better than ibuprofen in relieving pain. Your health care provider can talk to you about whether muscle relaxants might help in your situation. They may be helpful before bedtime when used for a short time, ie, a week or two. People who need to be alert, such as while driving or operating machinery, should not use muscle relaxants. Opioids (drugs derived from morphine) are not recommended for most people with back pain. In rare situations, a health care provider might prescribe them for a few days if a person has severe pain that is not responding to other treatments, but they are generally not much more effective than NSAIDs. In addition, opioids have a relatively high risk of side effects and the potential to cause harm, including the risk of dependency and abuse. Exercise  Starting a new exercise program immediately after a new episode of low back pain will not speed recovery from the acute episode. However, there is evidence that exercise is beneficial in people with chronic back pain. Spinal manipulation  "Spinal manipulation" is a technique sometimes used by chiropractors, physical therapists, osteopaths, massage therapists, and others to relieve back pain. It involves moving the joints of the spine beyond the normal range of motion. Studies suggest that spinal manipulation may provide modest pain relief and improved function, and it generally appears to be safe if performed by an experienced professional. If you are interested in trying this approach, talk with your health care provider about how to integrate it into your treatment plan. Acupuncture  Acupuncture involves inserting very fine needles into specific points, as determined by traditional Chinese maps of the body's flow of energy. There is not consistent evidence that acupuncture is effective for people with acute low back pain; however, some people find it helpful. Massage  There is no evidence that massage is effective in treating acute low back pain. However, you may find that it is generally relaxing and helps you feel better temporarily. Psychological therapy  In some cases, mental health issues can contribute to low back pain. Psychological therapy has mostly been studied in the context of treating longer-term (chronic) back pain; however, it may be beneficial for some people with acute pain as well. Other treatments  You may have heard of other treatments that claim to relieve back pain. Unfortunately, most of these have not been proven to work or are only effective in specific situations. Examples include: ?Injections  Some clinicians recommend injections of a local anesthetic (numbing medication) into the soft tissues of the back, although it is not clear if these injections are effective. The areas targeted by these injections are called "trigger points." Trigger-point injections may be of benefit in some people with chronic back pain, but they are typically not recommended for treating acute pain. Injections of a glucocorticoid (steroid) medication are sometimes recommended for people with chronic low back pain with sciatica or radiculopathy The injection is given into the epidural space, which is located below the spinal cord. Epidural glucocorticoid injections do appear to improve pain slightly at two and six weeks after the injection, but not at 3, 6, or 12 months after the injection. There is no evidence that epidural steroid injections are helpful for people with back pain without sciatica. ?Corsets and braces  While wearable supportive garments may claim to help relieve or prevent low back pain, these are typically not effective. ?Traction  Traction involves the use of weights to realign or pull the spinal column into alignment. Clinical studies have shown no benefit from traction in the treatment of acute back pain. ?Switching to a firmer mattress  People often wonder if sleeping on a firmer mattress can help prevent or treat low back pain. Small studies have suggested that using a less firm mattress may actually be more likely to relieve pain; however, there is not enough evidence to support switching to a specific type of sleeping surface for this reason. ?Methods involving energy or electricity  Other interventions include ultrasound, interferential therapy, shortwave diathermy, transcutaneous electrical nerve stimulation, and low-level laser therapy, all of which involve applying energy to the skin's surface. None of these interventions have been proven to be effective, particularly during the first four to six weeks of an episode of back pain. CHRONIC LOW BACK PAIN TREATMENTWhile most people recover completely from an episode of acute low back pain, some people do go on to have longer-term pain. Chronic pain is typica   Risks and benefits were discussed and include but not limited to renal damage and GI ulceration and bleeding. They were advised to take with food to limit stomach upset as well as warned to stop the medication if worsening gastric pain or dizziness or other side effects. Also to immediately stop the medication and seek appropriate medical attention if any severe stomach ache, gastritis, black/red vomit, black/red stools or any other medical concern. 5. hpn   DIETARY SALT (SODIUM); DASH DIET AND BLOOD PRESSURE: To decrease the sodium in your diet:   Use fresh vegetables and foods as much as possible.  Avoid canned and processed foods. Cured meats such as anderson, ham, and sausages are high in salt.  Try using different herbs and spices in your cooking instead of salt.  In restaurants, avoid foods with sauces, cheese, and cured meats. Ask for low-sodium choices. To get more potassium in your diet, eat:  Bananas, fresh or dried apricots, peaches, citrus fruits, melons  Cauliflower, broccoli, tomatoes, carrots, raw spinach, beet greens, potatoes To get more magnesium in your diet, eat:  Whole grain foods, leafy green vegetables, dried fruits  Fish and seafood, poultry  To get more calcium in your diet, eat:  Nonfat milk, yogurt, and low-fat cheeses   [place] and sardines  Cooked dried beans  Broccoli, kale, and bok damaris  Tofu or soybean curd DASH stands for "dietary approaches to stop hypertension." The DASH diet is low in total and saturated fat. It is rich in fruits, vegetables, and low-fat dairy foods. The diet allows you to get natural fiber, calcium, and magnesium from food. It prevents or lowers high blood pressure. It can also help lower cholesterol in your blood.  Don't change how you eat all at once. It's much more likely that you'll succeed if you make only one or two small changes at a time. Wait until those changes are a habit, then make a couple more changes. Some good starting steps include:  Add one serving of vegetables to your meals at lunch and dinner. This is an easy way to help you get more vegetables in your diet.  Have a piece of fruit as an afternoon or after-dinner snack. One glass of juice at breakfast is not enough fruit in your diet.  Use half your usual amount of butter, margarine, or salad dressing.  Buy nonfat salad dressing or nonfat sour cream. Follow this guide to select your menu of meals. The number of calories we want you to eat each day will tell you how many servings you can choose from each food group. Calories: 1600 2100 2600 3100 Servings Grains 6 7  10  12  Vegetables 	 4 4  5 6 Fruit 4 5 5 6 Dairy (low-fat) 2  3 3 3  Meat, poultry, fish  1  2 2  Nuts, seeds    1 Fats and sweets 1  2  3 4 Grains and grain products like breads and cereals provide energy, fiber and vitamins. Whole grains have more of these nutrients. One serving equals one of the following: Bagel, 1/2 medium; Barley, cooked 1/2 cup; Biscuit, country style 1 medium; Bread, whole wheat, white 1 slice; Cereals, cold or cooked, 1/2 cup; Cornbread, 1 medium piece; Crackers, gavino, 2; Crackers, saltine, 4; Dinner roll, 1medium; English muffin, ; Hamburger bun, ; Muffin, 1 medium; Pancake, 1 medium; Pasta, 1/2 cup cooked; Shira, 1/2 large or 1 small; Popcorn, 1 cup popped; Pretzels, 1 ounce; Rice, white, brown, or wild, 1/2 cup cooked; Tortillas, corn or flour, 1 medium; Waffle, 1 medium; Wheat germ, 1/4 cup;  Vegetables are rich sources of potassium, magnesium, and fiber. One serving is 1/2 cup of any of the following cooked vegetables: Asparagus, Beans (green, yellow), Beets, Broccoli, [place] Sprouts, Carrots, Cauliflower, [place], chicory, mustard and turnip (and other) greens, Corn, Kale, Lima beans, Mixed vegetables, Okra, Parsnips, Peas, green, Potatoes (1/2 medium or 1/2 cup mashed), Pumpkin, Rutabaga, Spaghetti or tomato sauce, Spinach, Squash (zucchini or yellow), Stewed tomatoes, Su[place]otash, Sweet potatoes, Turnips, Yam  Raw vegetables: Carrots,1/2 cup; Celery, 1/2 cup; Lettuce (sayra, loose-leaf, green-leaf), 1 cup; Peppers, 1/2 cup; Spinach, 1 cup; Tomato, 1/2 Fruits and fruit juices are important sources of potassium and magnesium. Fruits also contain fiber and are low in sodium and fat. One serving equals: Any fruit juice, # cup (6 ounces); Canned or frozen fruit,  cup (includes applesauce); Dried fruit,  cup;  Fresh fruit: Apple, 1 medium; Apricots, 2 medium; Banana, 1 medium; Berries, 1/2 cup; Melon, 1 wedge, or 1/2 cup; Cherries, 10; Grapefruit, 1/2; Grapes, 15; Kiwi, 1 medium; Rubén, 1/2 small; Nectarine, 1 medium; Orange, 1 medium; Peach, 1 medium; Pear, 1 medium; Pineapple, 1/2 cup; Plums, 2 medium; Tangerine, 1 large Dairy foods provide protein and calcium. Use low-fat or nonfat dairy products to cut down on fat. One serving equals: Skim milk, 1 cup (8 ounces); 1% low fat milk, 1 cup (8 ounces); 2% low fat milk, 1 cup (8 ounces) nonfat dry milk powder (1/3 cup); Low-fat cottage cheese, 1 cup (8 ounces); Parmesan cheese, 1 tablespoon; Mozzarella cheese, part skim, 1/4 cup (1 ounce); Low-fat cheddar cheese, 11/2 ounces; Ricotta cheese, part skim milk or nonfat, 1/4 cup (11/2 ounces); Other low fat or nonfat cheeses (11/2 oz.); Low-fat or nonfat yogurt, fruit-flavored or plain, 1 cup (8 ounces) Low-fat or nonfat frozen yogurt, 1/2 cup (4 ounces); Note: People who can't digest dairy products can try taking lactase enzyme pills or drops (available at drug and grocery stores) when they eat dairy. There is also milk available with the enzyme already added. Or you can buy lactose-free milk. Meat, poultry, and fish are good sources of protein and magnesium. One serving equals: Lean meat including beef, veal, or pork, 3 ounces cooked; Skinless, white meat poultry including turkey, chicken, 3 ounces; Fish and shellfish, 3 ounces cooked; Low-fat luncheon meats, 1 ounce; Egg, 1 medium; Tofu, 3 ounces Note: Three ounces of cooked meat is about the size of a deck of cards. Nuts, seeds, and legumes are rich sources of energy, magnesium, potassium, protein and fiber. Nuts and seeds are also high in fat, so portions should be small. Almonds, 1/3 cup; Beans such as kidney, sánchez, and navy, 1/2 cup cooked; Chickpeas and lentils, 1/2 cup cooked; Cashews, 1/3 cup; Filberts, 1/3 cup; Mixed nuts, 1/3 cup; Peanut butter, 2 tablespoons; Peanuts, 1/3 cup; Sesame seeds, 2 tablespoons; Sunflower seeds, 2 tablespoons; Tofu, regular, 3 ounces; Walnuts, 1/3 cup  Following the above diet will give you about 27% fat in your diet. The goal is to have 30% or less of the calories you eat each day be from fat. To meet that goal, do not eat more than 2-3 servings daily of added fat. Also try to limit sweets. One serving equals: Butter or margarine, 1 teaspoon; Mayonnaise, 1 teaspoon; Low-fat mayonnaise, 1 tablespoon; Salad dressing, 1 tablespoon; Low-fat salad dressing, 2 tablespoons; Oil, 1 teaspoon (use olive, canola, safflower, or other vegetable oils); Candy, hard, 3 pieces; Jelly or jam, 1 tablespoon); Jell-O, 1/2 cup; Jelly beans, 1/2 ounce; Maple syrup, 1 tablespoon; Popsicle, 1; Sherbet or nonfat or low-fat frozen yogurt, 1/2 cup; Sugar, 1 tablespoon; Sugared lemonade or fruit punch, 1 cup (8 ounces); Note: Try diet fruit-flavored gelatin or frozen, canned, or fresh fruit for dessert.  Small amounts of alcohol may have benefits to the heart and blood pressure. However, excess use of alcohol can cause damage to the brain, liver and other organs. It can lead to high blood pressure. Drinking more than two drinks (15 ml) every day can raise your blood pressure. 15 ml of alcohol equals:   one 12-ounce bottle of beer   a half glass (5 ounces) of wine   1 ounce (one shot) of 100 proof hard liquor 6. asthma   Common asthma triggers are:  ?Cigarette smoke  ?Stress  ?Getting sick with a cold, the flu, or a lung, ear, or sinus infection  ?Strong cleaning products, such as bleach  ?Strong perfumes or scents  ?Air pollution  ?Certain medicines, such as aspirin and other medicines for pain or fever  ?Exercise  ?Very cold and dry air   People can have other triggers, too. These include things in the environment that they are allergic to. These are called "allergic triggers." Examples of allergic triggers are:  ?Dust mites  These are tiny bugs that are too small for you to see. They live in beds, couches, carpets, and other places in your home.  ?Mold  Mold can grow in basements, showers, and other damp and wet places.  ?Dogs and cats  People can be allergic to animal saliva, urine, or dander (flakes of dead skin).  ?Pollen from trees, grass, and weeds  ?Cockroach droppings  ?Mice Here are other things that you can do:  ?To avoid getting sick with an infection, wash your hands often. You can also get a flu shot every year so you don't get the flu. Some people also need to get a vaccine to help prevent pneumonia.   ?To avoid air pollution, stay indoors when air pollution levels are high, and don't exercise near a busy road.   ?On winter days when the air is cold and dry, cover your mouth and nose with a scarf.   ?Avoid using bleach and other strong .   If you have allergic triggers, try to avoid the things you are allergic to:  ?To avoid dust mites, cover your pillows and mattresses with special covers that keep dust mites away, and remove carpets from the bedroom. For more tips, see the table    ?To avoid mold, regularly clean any areas that tend to grow mold, such as sinks and tiles. To keep more mold from growing, use a dehumidifier or air conditioner, fix leaky plumbing, and remove carpets that were damaged by water. You can also remove any indoor plants, as well as other items that might have gotten damp and grown mold, such as old books or bedding.   ?To avoid animal saliva, urine, or dander, you can remove the pet from your home and clean your home after the animal has gone. For more tips, see the table    ?To avoid pollen, you can stay inside more during the times of year when your asthma symptoms are worse. Keep windows closed to prevent pollen from getting inside your house. When you do go outside, shower or bathe after you come back in. This can help remove any pollen from your body and clothing.   ?To keep cockroaches away, don't let garbage or dirty dishes pile up. Fix leaky plumbing so there are no puddles of water. If you have cockroaches, use traps to kill them, or call an .   ?To get rid of mice, set traps or call an .    What if I can't avoid my triggers? If you can't avoid your triggers, talk with your doctor or nurse about what you can do.  Exercise is an example of a trigger that you should not avoid, because exercise keeps you healthy. To prevent asthma symptoms when you exercise:  ?Take an extra dose of your quick-relief inhaler medicine before you exercise  ?Warm up slowly before each exercise session  ?Avoid exercising outdoors if it is very cold out  7 hld  to lower  LDL and non HDL  cholesterol levels     1 limit your intake of foods full of saturated fats  , trans fats, and dietary cholesterol .  Food with a lot of saturated fate include butter, fatty flesh like red meat, full fat and low fat dairy  products  , palm oil and coconut oil .   If you see partially hydrogenated fat in the ingredient  list of food label that food has trans fats.  Top sources of dietary chol include egg yolks , organ meats and shell fish.  one Type of fat omega 3 Fatty acids has been shown to protect against heart disease  . Good sources are cold water fish like salmon, mackerel , halibut ., trout  herring and sardines.     Limit  your intake of meat , poultry and fish to no more than 3.5  ounces per day     Eat a lot more fiber rich foods  like beans , oats, barley fruits and vegetables   .  Food naturally rich in soluble fiber  are good at lowering cholesterol .    Excellent choices  are  oats , oat bran , barley , peas , yams sweet potatoes and legumes  or beans .  Good fruit sources are berries passion fruit, oranges pears,, apricots , apples  and nectarines  .    choose protein rich plant foods   such as legumes or beans nuts and seeds over meat.     Lose as much weight as possible and exercise   Take plant sterol supplements   .A Daily intake  of 1-2 gms  of plant sterols  lowers ldl .    Best choice is supplements  such as Cholestoff  by natures made   because they dont have calories  sugar trans fats   an salt  of many foods enriched with plant sterols.    Take  Metamucil or psyllium   .   Studies have shown 9-10 gms as daily of psyllium   the equivalent of  about  3 teaspoons  of sugar free Metamucil   reduced LDL levels  .

## 2025-06-11 NOTE — PHYSICAL EXAM
[Well Developed] : well developed [Well Nourished] : well nourished [No Acute Distress] : no acute distress [Normal Sclera/Conjunctiva] : normal sclera/conjunctiva [Well-Appearing] : well-appearing [EOMI] : extraocular movements intact [PERRL] : pupils equal round and reactive to light [Normal Outer Ear/Nose] : the outer ears and nose were normal in appearance [Normal Oropharynx] : the oropharynx was normal [No JVD] : no jugular venous distention [No Lymphadenopathy] : no lymphadenopathy [Supple] : supple [No Respiratory Distress] : no respiratory distress  [Thyroid Normal, No Nodules] : the thyroid was normal and there were no nodules present [No Accessory Muscle Use] : no accessory muscle use [Clear to Auscultation] : lungs were clear to auscultation bilaterally [Normal Rate] : normal rate  [Regular Rhythm] : with a regular rhythm [Normal S1, S2] : normal S1 and S2 [No Carotid Bruits] : no carotid bruits [No Murmur] : no murmur heard [No Abdominal Bruit] : a ~M bruit was not heard ~T in the abdomen [No Varicosities] : no varicosities [Pedal Pulses Present] : the pedal pulses are present [No Edema] : there was no peripheral edema [No Palpable Aorta] : no palpable aorta [No Extremity Clubbing/Cyanosis] : no extremity clubbing/cyanosis [Non Tender] : non-tender [Soft] : abdomen soft [Non-distended] : non-distended [No HSM] : no HSM [Normal Bowel Sounds] : normal bowel sounds [Normal Posterior Cervical Nodes] : no posterior cervical lymphadenopathy [No CVA Tenderness] : no CVA  tenderness [Normal Anterior Cervical Nodes] : no anterior cervical lymphadenopathy [No Spinal Tenderness] : no spinal tenderness [Normal Kyphosis] : normal kyphosis [Normal Lordosis] : normal lordosis [No Visual Abnormalities] : no visible abnormalities [No Scoliosis] : no scoliosis [No Tenderness to Palpation] : no spine tenderness on palpation [Full ROM] : full ROM [No Masses] : no masses [No Pain with ROM] : no pain with motion in any direction [Intact] : all sensory within normal limits bilaterally [No Joint Swelling] : no joint swelling [Grossly Normal Strength/Tone] : grossly normal strength/tone [No Rash] : no rash [Coordination Grossly Intact] : coordination grossly intact [Normal Gait] : normal gait [No Focal Deficits] : no focal deficits [Normal Affect] : the affect was normal [Deep Tendon Reflexes (DTR)] : deep tendon reflexes were 2+ and symmetric [Normal Insight/Judgement] : insight and judgment were intact [Alert and Oriented x3] : oriented to person, place, and time [Comprehensive Foot Exam Normal] : Right and left foot were examined and both feet are normal. No ulcers in either foot. Toes are normal and with full ROM.  Normal tactile sensation with monofilament testing throughout both feet